# Patient Record
Sex: MALE | Race: BLACK OR AFRICAN AMERICAN | NOT HISPANIC OR LATINO | URBAN - METROPOLITAN AREA
[De-identification: names, ages, dates, MRNs, and addresses within clinical notes are randomized per-mention and may not be internally consistent; named-entity substitution may affect disease eponyms.]

---

## 2017-12-18 ENCOUNTER — HISTORICAL (OUTPATIENT)
Dept: ADMINISTRATIVE | Facility: HOSPITAL | Age: 42
End: 2017-12-18

## 2017-12-18 LAB
ABS NEUT (OLG): 2.51 X10(3)/MCL (ref 2.1–9.2)
ALBUMIN SERPL-MCNC: 4 GM/DL (ref 3.4–5)
ALBUMIN/GLOB SERPL: 1 RATIO (ref 1–2)
ALP SERPL-CCNC: 78 UNIT/L (ref 45–117)
ALT SERPL-CCNC: 20 UNIT/L (ref 12–78)
AST SERPL-CCNC: 15 UNIT/L (ref 15–37)
BASOPHILS # BLD AUTO: 0.04 X10(3)/MCL
BASOPHILS NFR BLD AUTO: 1 % (ref 0–1)
BILIRUB SERPL-MCNC: 0.5 MG/DL (ref 0.2–1)
BILIRUBIN DIRECT+TOT PNL SERPL-MCNC: 0.1 MG/DL
BILIRUBIN DIRECT+TOT PNL SERPL-MCNC: 0.4 MG/DL
BUN SERPL-MCNC: 10 MG/DL (ref 7–18)
CALCIUM SERPL-MCNC: 9 MG/DL (ref 8.5–10.1)
CHLORIDE SERPL-SCNC: 105 MMOL/L (ref 98–107)
CHOLEST SERPL-MCNC: 195 MG/DL
CHOLEST/HDLC SERPL: 3.7 {RATIO} (ref 0–5)
CO2 SERPL-SCNC: 29 MMOL/L (ref 21–32)
CREAT SERPL-MCNC: 1.1 MG/DL (ref 0.6–1.3)
EOSINOPHIL # BLD AUTO: 0.45 10*3/UL
EOSINOPHIL NFR BLD AUTO: 9 % (ref 0–5)
ERYTHROCYTE [DISTWIDTH] IN BLOOD BY AUTOMATED COUNT: 12.3 % (ref 11.5–14.5)
EST. AVERAGE GLUCOSE BLD GHB EST-MCNC: 108 MG/DL
GLOBULIN SER-MCNC: 4.4 GM/ML (ref 2.3–3.5)
GLUCOSE SERPL-MCNC: 89 MG/DL (ref 74–106)
HBA1C MFR BLD: 5.4 % (ref 4.2–6.3)
HCT VFR BLD AUTO: 43.6 % (ref 40–51)
HDLC SERPL-MCNC: 53 MG/DL
HGB BLD-MCNC: 14.6 GM/DL (ref 13.5–17.5)
HIV 1+2 AB+HIV1 P24 AG SERPL QL IA: NONREACTIVE
IMM GRANULOCYTES # BLD AUTO: 0.01 10*3/UL
IMM GRANULOCYTES NFR BLD AUTO: 0 %
LDLC SERPL CALC-MCNC: 131 MG/DL (ref 0–130)
LYMPHOCYTES # BLD AUTO: 1.46 X10(3)/MCL
LYMPHOCYTES NFR BLD AUTO: 30 % (ref 15–40)
MCH RBC QN AUTO: 27.5 PG (ref 26–34)
MCHC RBC AUTO-ENTMCNC: 33.5 GM/DL (ref 31–37)
MCV RBC AUTO: 82.1 FL (ref 80–100)
MONOCYTES # BLD AUTO: 0.37 X10(3)/MCL
MONOCYTES NFR BLD AUTO: 8 % (ref 4–12)
NEUTROPHILS # BLD AUTO: 2.51 X10(3)/MCL
NEUTROPHILS NFR BLD AUTO: 52 X10(3)/MCL
PLATELET # BLD AUTO: 225 X10(3)/MCL (ref 130–400)
PMV BLD AUTO: 12.1 FL (ref 7.4–10.4)
POTASSIUM SERPL-SCNC: 4.2 MMOL/L (ref 3.5–5.1)
PROT SERPL-MCNC: 8.4 GM/DL (ref 6.4–8.2)
RBC # BLD AUTO: 5.31 X10(6)/MCL (ref 4.5–5.9)
SODIUM SERPL-SCNC: 141 MMOL/L (ref 136–145)
TRIGL SERPL-MCNC: 54 MG/DL
VLDLC SERPL CALC-MCNC: 11 MG/DL
WBC # SPEC AUTO: 4.8 X10(3)/MCL (ref 4.5–11)

## 2018-03-14 ENCOUNTER — HISTORICAL (OUTPATIENT)
Dept: FAMILY MEDICINE | Facility: CLINIC | Age: 43
End: 2018-03-14

## 2018-04-20 ENCOUNTER — HISTORICAL (OUTPATIENT)
Dept: RADIOLOGY | Facility: HOSPITAL | Age: 43
End: 2018-04-20

## 2018-10-19 ENCOUNTER — HISTORICAL (OUTPATIENT)
Dept: FAMILY MEDICINE | Facility: CLINIC | Age: 43
End: 2018-10-19

## 2018-10-19 LAB
B-HCG FREE SERPL-ACNC: <1 MIU/ML
LH SERPL-ACNC: 5 MIU/ML (ref 1.2–10.6)
PROLACTIN LEVEL (OHS): 12.3 NG/ML (ref 2.5–17.4)
TESTOST SERPL-MCNC: 511 NG/DL (ref 241–827)

## 2019-09-23 ENCOUNTER — HISTORICAL (OUTPATIENT)
Dept: ADMINISTRATIVE | Facility: HOSPITAL | Age: 44
End: 2019-09-23

## 2019-09-23 LAB
ABS NEUT (OLG): 1.71 X10(3)/MCL (ref 2.1–9.2)
ALBUMIN SERPL-MCNC: 3.8 GM/DL (ref 3.4–5)
ALBUMIN/GLOB SERPL: 0.7 RATIO (ref 1.1–2)
ALP SERPL-CCNC: 85 UNIT/L (ref 45–117)
ALT SERPL-CCNC: 17 UNIT/L (ref 12–78)
AST SERPL-CCNC: 16 UNIT/L (ref 15–37)
BASOPHILS # BLD AUTO: 0 X10(3)/MCL (ref 0–0.2)
BASOPHILS NFR BLD AUTO: 1 %
BILIRUB SERPL-MCNC: 0.5 MG/DL (ref 0.2–1)
BILIRUBIN DIRECT+TOT PNL SERPL-MCNC: 0.2 MG/DL (ref 0–0.2)
BILIRUBIN DIRECT+TOT PNL SERPL-MCNC: 0.3 MG/DL
BUN SERPL-MCNC: 7 MG/DL (ref 7–18)
CALCIUM SERPL-MCNC: 9.2 MG/DL (ref 8.5–10.1)
CHLORIDE SERPL-SCNC: 106 MMOL/L (ref 98–107)
CHOLEST SERPL-MCNC: 221 MG/DL
CHOLEST/HDLC SERPL: 5.5 {RATIO} (ref 0–5)
CO2 SERPL-SCNC: 30 MMOL/L (ref 21–32)
CREAT SERPL-MCNC: 1.1 MG/DL (ref 0.6–1.3)
EOSINOPHIL # BLD AUTO: 0.6 X10(3)/MCL (ref 0–0.9)
EOSINOPHIL NFR BLD AUTO: 13 %
ERYTHROCYTE [DISTWIDTH] IN BLOOD BY AUTOMATED COUNT: 12.3 % (ref 11.5–14.5)
EST. AVERAGE GLUCOSE BLD GHB EST-MCNC: 105 MG/DL
GLOBULIN SER-MCNC: 5.1 GM/ML (ref 2.3–3.5)
GLUCOSE SERPL-MCNC: 65 MG/DL (ref 74–106)
HBA1C MFR BLD: 5.3 % (ref 4.2–6.3)
HBV SURFACE AB SER-ACNC: <3.1 MIU/ML
HBV SURFACE AB SERPL IA-ACNC: NONREACTIVE M[IU]/ML
HCT VFR BLD AUTO: 45.7 % (ref 40–51)
HCV AB SERPL QL IA: NONREACTIVE
HDLC SERPL-MCNC: 40 MG/DL (ref 40–59)
HGB BLD-MCNC: 14.8 GM/DL (ref 13.5–17.5)
HIV 1+2 AB+HIV1 P24 AG SERPL QL IA: NONREACTIVE
IMM GRANULOCYTES # BLD AUTO: 0.01 10*3/UL
IMM GRANULOCYTES NFR BLD AUTO: 0 %
LDLC SERPL CALC-MCNC: 168 MG/DL
LYMPHOCYTES # BLD AUTO: 1.8 X10(3)/MCL (ref 0.6–4.6)
LYMPHOCYTES NFR BLD AUTO: 38 %
MCH RBC QN AUTO: 27 PG (ref 26–34)
MCHC RBC AUTO-ENTMCNC: 32.4 GM/DL (ref 31–37)
MCV RBC AUTO: 83.2 FL (ref 80–100)
MONOCYTES # BLD AUTO: 0.6 X10(3)/MCL (ref 0.1–1.3)
MONOCYTES NFR BLD AUTO: 12 %
NEUTROPHILS # BLD AUTO: 1.71 X10(3)/MCL (ref 2.1–9.2)
NEUTROPHILS NFR BLD AUTO: 36 %
PLATELET # BLD AUTO: 213 X10(3)/MCL (ref 130–400)
PMV BLD AUTO: 11.8 FL (ref 7.4–10.4)
POTASSIUM SERPL-SCNC: 4.2 MMOL/L (ref 3.5–5.1)
PROT SERPL-MCNC: 8.9 GM/DL (ref 6.4–8.2)
RBC # BLD AUTO: 5.49 X10(6)/MCL (ref 4.5–5.9)
RPR SER QL: REACTIVE
SODIUM SERPL-SCNC: 139 MMOL/L (ref 136–145)
T PALLIDUM AB SER QL: REACTIVE
TRIGL SERPL-MCNC: 63 MG/DL
VLDLC SERPL CALC-MCNC: 13 MG/DL
WBC # SPEC AUTO: 4.8 X10(3)/MCL (ref 4.5–11)

## 2021-02-04 ENCOUNTER — HISTORICAL (OUTPATIENT)
Dept: ADMINISTRATIVE | Facility: HOSPITAL | Age: 46
End: 2021-02-04

## 2021-02-04 LAB
ABS NEUT (OLG): 2.18 X10(3)/MCL (ref 2.1–9.2)
ALBUMIN SERPL-MCNC: 3.9 GM/DL (ref 3.5–5)
ALBUMIN/GLOB SERPL: 0.9 RATIO (ref 1.1–2)
ALP SERPL-CCNC: 72 UNIT/L (ref 40–150)
ALT SERPL-CCNC: 21 UNIT/L (ref 0–55)
AST SERPL-CCNC: 20 UNIT/L (ref 5–34)
BASOPHILS # BLD AUTO: 0 X10(3)/MCL (ref 0–0.2)
BASOPHILS NFR BLD AUTO: 1 %
BILIRUB SERPL-MCNC: 0.6 MG/DL
BILIRUBIN DIRECT+TOT PNL SERPL-MCNC: 0.2 MG/DL (ref 0–0.5)
BILIRUBIN DIRECT+TOT PNL SERPL-MCNC: 0.4 MG/DL (ref 0–0.8)
BUN SERPL-MCNC: 9 MG/DL (ref 8.9–20.6)
CALCIUM SERPL-MCNC: 9.3 MG/DL (ref 8.4–10.2)
CHLORIDE SERPL-SCNC: 105 MMOL/L (ref 98–107)
CHOLEST SERPL-MCNC: 245 MG/DL
CHOLEST/HDLC SERPL: 6 {RATIO} (ref 0–5)
CO2 SERPL-SCNC: 27 MMOL/L (ref 22–29)
CREAT SERPL-MCNC: 1.12 MG/DL (ref 0.73–1.18)
EOSINOPHIL # BLD AUTO: 0.4 X10(3)/MCL (ref 0–0.9)
EOSINOPHIL NFR BLD AUTO: 9 %
ERYTHROCYTE [DISTWIDTH] IN BLOOD BY AUTOMATED COUNT: 12.4 % (ref 11.5–14.5)
EST. AVERAGE GLUCOSE BLD GHB EST-MCNC: 114 MG/DL
GLOBULIN SER-MCNC: 4.2 GM/DL (ref 2.4–3.5)
GLUCOSE SERPL-MCNC: 84 MG/DL (ref 74–100)
HBA1C MFR BLD: 5.6 %
HCT VFR BLD AUTO: 43.7 % (ref 40–51)
HDLC SERPL-MCNC: 38 MG/DL (ref 35–60)
HGB BLD-MCNC: 14.3 GM/DL (ref 13.5–17.5)
IMM GRANULOCYTES # BLD AUTO: 0.02 10*3/UL
IMM GRANULOCYTES NFR BLD AUTO: 0 %
LDLC SERPL CALC-MCNC: 195 MG/DL (ref 50–140)
LYMPHOCYTES # BLD AUTO: 1.7 X10(3)/MCL (ref 0.6–4.6)
LYMPHOCYTES NFR BLD AUTO: 35 %
MCH RBC QN AUTO: 27.2 PG (ref 26–34)
MCHC RBC AUTO-ENTMCNC: 32.7 GM/DL (ref 31–37)
MCV RBC AUTO: 83.1 FL (ref 80–100)
MONOCYTES # BLD AUTO: 0.4 X10(3)/MCL (ref 0.1–1.3)
MONOCYTES NFR BLD AUTO: 9 %
NEUTROPHILS # BLD AUTO: 2.18 X10(3)/MCL (ref 2.1–9.2)
NEUTROPHILS NFR BLD AUTO: 45 %
PLATELET # BLD AUTO: 213 X10(3)/MCL (ref 130–400)
PMV BLD AUTO: 11.7 FL (ref 7.4–10.4)
POTASSIUM SERPL-SCNC: 4.2 MMOL/L (ref 3.5–5.1)
PROT SERPL-MCNC: 8.1 GM/DL (ref 6.4–8.3)
RBC # BLD AUTO: 5.26 X10(6)/MCL (ref 4.5–5.9)
SODIUM SERPL-SCNC: 139 MMOL/L (ref 136–145)
TRIGL SERPL-MCNC: 60 MG/DL (ref 34–140)
VLDLC SERPL CALC-MCNC: 12 MG/DL
WBC # SPEC AUTO: 4.8 X10(3)/MCL (ref 4.5–11)

## 2021-05-26 ENCOUNTER — HISTORICAL (OUTPATIENT)
Dept: RESPIRATORY THERAPY | Facility: HOSPITAL | Age: 46
End: 2021-05-26

## 2021-11-05 ENCOUNTER — HISTORICAL (OUTPATIENT)
Dept: ADMINISTRATIVE | Facility: HOSPITAL | Age: 46
End: 2021-11-05

## 2021-11-05 LAB
CHOLEST SERPL-MCNC: 247 MG/DL
CHOLEST/HDLC SERPL: 5 {RATIO} (ref 0–5)
HDLC SERPL-MCNC: 45 MG/DL (ref 35–60)
LDLC SERPL CALC-MCNC: 188 MG/DL (ref 50–140)
TRIGL SERPL-MCNC: 70 MG/DL (ref 34–140)
VLDLC SERPL CALC-MCNC: 14 MG/DL

## 2022-04-10 ENCOUNTER — HISTORICAL (OUTPATIENT)
Dept: ADMINISTRATIVE | Facility: HOSPITAL | Age: 47
End: 2022-04-10
Payer: MEDICAID

## 2022-04-28 VITALS
OXYGEN SATURATION: 98 % | DIASTOLIC BLOOD PRESSURE: 76 MMHG | SYSTOLIC BLOOD PRESSURE: 121 MMHG | HEIGHT: 71 IN | BODY MASS INDEX: 22.41 KG/M2 | WEIGHT: 160.06 LBS

## 2022-05-04 NOTE — HISTORICAL [facility] CERNER
This is a historical note converted from Dian. Formatting and pictures may have been removed.  Please reference Dian for original formatting and attached multimedia. Chief Complaint  routine f/u  History of Present Illness  Mr. Adkins is a 44 y/o male with PMHx of depression, mild intermittent asthma, and HLD?presenting for routine follow-up. He reports no current complaints  ?  HLD  Pt reports he takes medication 3-4 times per week. he has not been able to make diet changes.  ?  Depression  ?He reports continued good mood. denies SI/HI, fatigue, guilt, decreased interest. He feels current medication helps him though he does not appear to take medication every day as prescribed.  ?  Review of Systems  Constitutional:?no fevers, chills or fatigue  Respiratory:?no trouble breathing or shortness of breath  Cardiovascular:?no chest pain or tightness,?no shortness breath on activity,?no ankle swelling  Gastrointestinal:no constipation,?no diarrhea,?no nausea,?no vomiting  Musculoskeletal:?No muscle pain,?no joint pain  Integumentary: no rashes or breakdown  Neurologic: no dizziness, no fainting  Physical Exam  Vitals & Measurements  T:?37.1? ?C (Oral)? HR:?75(Peripheral)? RR:?18? BP:?121/76? SpO2:?98%?  HT:?180.00?cm? WT:?72.600?kg? BMI:?22.41?  General:?Alert and oriented,?no acute distress  Respiratory:?Lungs clear to auscultation bilaterally,?No increased work of breathing  Cardiovascular:?S1-S2, regular rate, regular rhythm,?2+ radial pulses,?No lower extremity edema  Gastrointestinal:?Soft, nontender, nondistended  Psych: Calm, cooperative  Assessment/Plan  1.?Depressive disorder(  Confirmed  )?F32.9  - Continue Fluoxetine  - Stable. Continue to monitor  Ordered:  Clinic Follow up, *Est. 02/05/22 3:00:00 CST, Order for future visit, Depressive disorder(  Confirmed  )  HLD (hyperlipidemia)(  Confirmed  )  Immunization due, [facility]  ?  2.?HLD (hyperlipidemia)(  Confirmed  )?E78.5  - Lipid  panel today  - Hand out provided  - Refill Atorvastatin  Ordered:  Clinic Follow up, *Est. 02/05/22 3:00:00 CST, Order for future visit, Depressive disorder(  Confirmed  )  HLD (hyperlipidemia)(  Confirmed  )  Immunization due, [facility]  Lipid Panel, Routine collect, *Est. 11/05/21 3:00:00 CDT, Blood, Order for future visit, *Est. Stop date 11/05/21 3:00:00 CDT, Lab Collect, HLD (hyperlipidemia)(  Confirmed  ), 11/05/21 13:43:00 CDT  ?  3.?Immunization due?Z23  ?- Declines Flu vaccination at this time  Ordered:  Clinic Follow up, *Est. 02/05/22 3:00:00 CST, Order for future visit, Depressive disorder(  Confirmed  )  HLD (hyperlipidemia)(  Confirmed  )  Immunization due, [facility]  ?  Orders:  atorvastatin, See Instructions, TAKE 1 TABLET BY MOUTH EVERY DAY, # 30 tab(s), 2 Refill(s), Pharmacy: [facility]/pharmacy #[id number], 180, cm, Height/Length Dosing, 11/05/21 13:09:00 CDT, 72.6, kg, Weight Dosing, 11/05/21 13:09:00 CDT  FLUoxetine, See Instructions, TAKE 1 CAPSULE BY MOUTH EVERY DAY, # 90 cap(s), 0 Refill(s), Pharmacy: [facility]/pharmacy #[id number], 180, cm, Height/Length Dosing, 11/05/21 13:09:00 CDT, 72.6, kg, Weight Dosing, 11/05/21 13:09:00 CDT  ?  RTC in 3 months f/u asthma and health maintenance  ?  July Rosado M.D.  [facility]  ?  Referrals  Clinic Follow up, *Est. 02/05/22 3:00:00 CST, Order for future visit, Depressive disorder(  Confirmed  )  HLD (hyperlipidemia)(  Confirmed  )  Immunization due, [facility]   Problem List/Past Medical History  Ongoing  Asthma(  Confirmed  )  Depressive disorder(  Confirmed  )  Gynecomastia  HLD (hyperlipidemia)(  Confirmed  )  Historical  No qualifying data  Procedure/Surgical History  skin lesion removed on face   Medications  Albuterol (Eqv-ProAir HFA) 90 mcg/inh inhalation aerosol, See Instructions  atorvastatin 40 mg oral tablet, See Instructions, 2 refills  fluoxetine 20 mg oral capsule, See  Instructions  Allergies  No Known Medication Allergies  Social History  Abuse/Neglect  No, 04/27/2021  Alcohol  Current, Beer, 1-2 times per month, Alcohol use interferes with work or home: No. Drinks more than intended: No. Others hurt by drinking: No. Ready to change: No. Household alcohol concerns: No., 10/17/2018  Employment/School  Work/School description: Disabled. Highest education level: High school. Operates hazardous equipment: No., 10/17/2018  Exercise  Exercise frequency: 1-2 times/week. Self assessment: Fair condition. Exercise type: Walking., 03/10/2015  Home/Environment  Lives with Siblings. Alcohol abuse in household: No. Substance abuse in household: No. Smoker in household: Yes. Injuries/Abuse/Neglect in household: No. Feels unsafe at home: No. Safe place to go: Yes. Family/Friends available for support: Yes., 03/10/2015  Lives with Siblings. Alcohol abuse in household: No. Substance abuse in household: No. Smoker in household: Yes. Injuries/Abuse/Neglect in household: No. Feels unsafe at home: No. Safe place to go: Yes. Family/Friends available for support: Yes., 03/10/2015  Nutrition/Health  Regular, 03/10/2015  Sexual  Sexually active: Yes. Sexually active at age 19 Years. Number of current partners 1. Number of lifetime partners 10. Sexual orientation: Straight or heterosexual. Uses condoms: Yes. History of sexual abuse: No. Gender Identity Identifies as male., 01/11/2019  Spiritual/Cultural  None, 09/23/2019  Substance Use - Denies Substance Abuse, 02/28/2015  Never, 10/17/2018  Tobacco  Never (less than 100 in lifetime), N/A, 04/27/2021  Family History  Heart disease: Mother.  Mental illness: Mother.  Schizophrenia: Brother.  Immunizations  Vaccine Date Status   influenza virus vaccine, inactivated 12/03/2018 Recorded   tetanus/diphtheria/pertussis, acel(Tdap) 10/17/2018 Given   influenza virus vaccine, inactivated 01/09/2015 Recorded   influenza virus vaccine, inactivated 11/01/2011 Recorded    Lab Results  Test Name Test Result Date/Time   Hgb A1c 5.6 % 02/04/2021 10:35 CST   Chol 245 mg/dL (High) 02/04/2021 10:35 CST   Trig 60 mg/dL 02/04/2021 10:35 CST   .00 mg/dL (High) 02/04/2021 10:35 CST       [ x ] I discussed the case with the resident and agree with the findings and plan as documented in the residents note.

## 2022-05-04 NOTE — HISTORICAL [facility] CERNER
This is a historical note converted from Dian. Formatting and pictures may have been removed.  Please reference Dian for original formatting and attached multimedia. Chief Complaint  Routine follow up. ?No complaints  History of Present Illness  Mr. Multani is a 46 yo M with PMHx of depression and mild intermittent asthma who presents?for routine follow-up without complaints.  ?   Asthma: well controlled with as needed albuterol only.? Uses once per week.? No cough, wheezing, fever, or chest tightness.  Depression: On fluoxetine 20mg daily.? No issues.? Sleeping and eating well.? Denies depressed mood or feelings of guilt or worthlessness.? No SI/HI or hallucinations.  Review of Systems  Constitutional: no fever, no chills  CV: no chest pain  Resp: no shortness of breath  GI: no nausea, no vomiting, no constipation, no diarrhea  MSK: no weakness  Skin: no rashes, no discoloration, no wounds  Physical Exam  Vitals & Measurements  T:?36.9? ?C (Oral)? HR:?71(Peripheral)? RR:?16? BP:?116/71?  HT:?180.34?cm? WT:?75.300?kg? BMI:?23.15?  General: well developed;?pleasant and cooperative  HEENT: oral mucosa moist, EOMI  Skin: no rashes, no discoloration  CV: regular rate, well perfused  Resp: non-labored breathing, no audible?wheezes  MSK:?moves all limbs,?no swelling  Neuro: AAOx4, appropriate mood and affect  Assessment/Plan  1.?Asthma(  Confirmed  )?J45.909  - continue albuterol as needed  - states he got flu vaccine at [facility] last month.? no record of this.? Asked him to get copy and bring to clinic if able to.  Ordered:  Clinic Follow up, *Est. 05/04/21 3:00:00 CDT, Order for future visit, Asthma(  Confirmed  )  Depressive disorder(  Confirmed  )  HLD (hyperlipidemia)(  Confirmed  ), [facility]  ?  2.?Depressive disorder(  Confirmed  )?F32.9  - seems well controlled at this time; continue fluoxetine 20mg daily  - ED precautions for SI/HI or hallucinations  Ordered:  Clinic Follow up, *Est.  05/04/21 3:00:00 CDT, Order for future visit, Asthma(  Confirmed  )  Depressive disorder(  Confirmed  )  HLD (hyperlipidemia)(  Confirmed  ), [facility]  ?  3.?HLD (hyperlipidemia)(  Confirmed  )?E78.5  - no meds currently; recheck FLP  - will also check CBC, CMP, and HgbA1c  Ordered:  CBC w/ Auto Diff, Routine collect, *Est. 02/04/21 3:00:00 CST, Blood, Order for future visit, *Est. Stop date 02/04/21 3:00:00 CST, Lab Collect, HLD (hyperlipidemia)(  Confirmed  ), 02/04/21 10:19:00 CST  Clinic Follow up, *Est. 05/04/21 3:00:00 CDT, Order for future visit, Asthma(  Confirmed  )  Depressive disorder(  Confirmed  )  HLD (hyperlipidemia)(  Confirmed  ), [facility]  Comprehensive Metabolic Panel, Routine collect, *Est. 02/04/21 3:00:00 CST, Blood, Order for future visit, *Est. Stop date 02/04/21 3:00:00 CST, Lab Collect, HLD (hyperlipidemia)(  Confirmed  ), 02/04/21 10:19:00 CST  Hemoglobin A1C [facility], Routine collect, *Est. 02/04/21 3:00:00 CST, Blood, Order for future visit, *Est. Stop date 02/04/21 3:00:00 CST, Lab Collect, HLD (hyperlipidemia)(  Confirmed  ), 02/04/21 10:19:00 CST  Lipid Panel, Routine collect, *Est. 02/04/21 3:00:00 CST, Blood, Order for future visit, *Est. Stop date 02/04/21 3:00:00 CST, Lab Collect, HLD (hyperlipidemia)(  Confirmed  ), 02/04/21 10:19:00 CST  ?  Referrals  Clinic Follow up, *Est. 05/04/21 3:00:00 CDT, Order for future visit, Asthma(  Confirmed  )  Depressive disorder(  Confirmed  )  HLD (hyperlipidemia)(  Confirmed  ), [facility]  ?  RTC in 3 months with PCP Dr. Wynn  ?  Williams Lerner MD  [facility], PGY-3   Problem List/Past Medical History  Ongoing  Asthma(  Confirmed  )  Depressive disorder(  Confirmed  )  Gynecomastia  HLD (hyperlipidemia)(  Confirmed  )  Historical  No qualifying data  Procedure/Surgical History  skin lesion removed on face   Medications  fluoxetine 20 mg oral  capsule, 20 mg= 1 cap(s), Oral, Daily, 3 refills  ProAir HFA 90 mcg/inh inhalation aerosol with adapter, 2 puff(s), INH, q6hr, 1 refills  Allergies  No Known Medication Allergies  Social History  Abuse/Neglect  No, 09/23/2019  Alcohol  Current, Beer, 1-2 times per month, Alcohol use interferes with work or home: No. Drinks more than intended: No. Others hurt by drinking: No. Ready to change: No. Household alcohol concerns: No., 10/17/2018  Employment/School  Work/School description: Disabled. Highest education level: High school. Operates hazardous equipment: No., 10/17/2018  Exercise  Exercise frequency: 1-2 times/week. Self assessment: Fair condition. Exercise type: Walking., 03/10/2015  Home/Environment  Lives with Siblings. Alcohol abuse in household: No. Substance abuse in household: No. Smoker in household: Yes. Injuries/Abuse/Neglect in household: No. Feels unsafe at home: No. Safe place to go: Yes. Family/Friends available for support: Yes., 03/10/2015  Lives with Siblings. Alcohol abuse in household: No. Substance abuse in household: No. Smoker in household: Yes. Injuries/Abuse/Neglect in household: No. Feels unsafe at home: No. Safe place to go: Yes. Family/Friends available for support: Yes., 03/10/2015  Nutrition/Health  Regular, 03/10/2015  Sexual  Sexually active: Yes. Sexually active at age 19 Years. Number of current partners 1. Number of lifetime partners 10. Sexual orientation: Straight or heterosexual. Uses condoms: Yes. History of sexual abuse: No. Gender Identity Identifies as male., 01/11/2019  Spiritual/Cultural  None, 09/23/2019  Substance Use - Denies Substance Abuse, 02/28/2015  Never, 10/17/2018  Tobacco  Never (less than 100 in lifetime), N/A, 02/04/2021  Family History  Heart disease: Mother.  Mental illness: Mother.  Schizophrenia: Brother.  Immunizations  Vaccine Date Status   influenza virus vaccine, inactivated 12/03/2018 Recorded   tetanus/diphtheria/pertussis, acel(Tdap) 10/17/2018  Given   influenza virus vaccine, inactivated 01/09/2015 Recorded   influenza virus vaccine, inactivated 11/01/2011 Recorded       [ x ] I reviewed the case and agree with the findings and plan as documented in the residents note.

## 2022-09-23 RX ORDER — FLUOXETINE HYDROCHLORIDE 20 MG/1
20 CAPSULE ORAL DAILY
Qty: 90 CAPSULE | Refills: 0 | Status: CANCELLED | OUTPATIENT
Start: 2022-09-23

## 2022-09-23 RX ORDER — FLUOXETINE HYDROCHLORIDE 20 MG/1
1 CAPSULE ORAL DAILY
COMMUNITY
Start: 2022-01-31 | End: 2022-09-26 | Stop reason: SDUPTHER

## 2022-09-23 RX ORDER — ATORVASTATIN CALCIUM 40 MG/1
1 TABLET, FILM COATED ORAL DAILY
COMMUNITY
Start: 2022-01-31 | End: 2022-09-26 | Stop reason: SDUPTHER

## 2022-09-26 RX ORDER — FLUOXETINE HYDROCHLORIDE 20 MG/1
20 CAPSULE ORAL DAILY
Qty: 30 CAPSULE | Refills: 2 | Status: SHIPPED | OUTPATIENT
Start: 2022-09-26 | End: 2022-10-04 | Stop reason: SDUPTHER

## 2022-09-26 RX ORDER — ATORVASTATIN CALCIUM 40 MG/1
40 TABLET, FILM COATED ORAL DAILY
Qty: 30 TABLET | Refills: 1 | Status: SHIPPED | OUTPATIENT
Start: 2022-09-26 | End: 2022-10-04 | Stop reason: SDUPTHER

## 2022-10-04 ENCOUNTER — OFFICE VISIT (OUTPATIENT)
Dept: FAMILY MEDICINE | Facility: CLINIC | Age: 47
End: 2022-10-04
Payer: MEDICAID

## 2022-10-04 VITALS
RESPIRATION RATE: 18 BRPM | WEIGHT: 159.38 LBS | HEART RATE: 80 BPM | OXYGEN SATURATION: 98 % | SYSTOLIC BLOOD PRESSURE: 121 MMHG | HEIGHT: 70 IN | DIASTOLIC BLOOD PRESSURE: 82 MMHG | TEMPERATURE: 98 F | BODY MASS INDEX: 22.82 KG/M2

## 2022-10-04 DIAGNOSIS — Z28.21 INFLUENZA VACCINATION DECLINED: ICD-10-CM

## 2022-10-04 DIAGNOSIS — Z76.0 ENCOUNTER FOR MEDICATION REFILL: ICD-10-CM

## 2022-10-04 DIAGNOSIS — E78.5 HYPERLIPIDEMIA, UNSPECIFIED HYPERLIPIDEMIA TYPE: ICD-10-CM

## 2022-10-04 DIAGNOSIS — F32.A DEPRESSIVE DISORDER: Primary | ICD-10-CM

## 2022-10-04 PROBLEM — J45.909 ASTHMA: Status: ACTIVE | Noted: 2022-10-04

## 2022-10-04 PROCEDURE — 99213 OFFICE O/P EST LOW 20 MIN: CPT | Mod: PBBFAC

## 2022-10-04 RX ORDER — ATORVASTATIN CALCIUM 40 MG/1
40 TABLET, FILM COATED ORAL DAILY
Qty: 90 TABLET | Refills: 0 | Status: SHIPPED | OUTPATIENT
Start: 2022-10-04 | End: 2023-02-10 | Stop reason: SDUPTHER

## 2022-10-04 RX ORDER — FLUOXETINE HYDROCHLORIDE 20 MG/1
20 CAPSULE ORAL DAILY
Qty: 90 CAPSULE | Refills: 0 | Status: SHIPPED | OUTPATIENT
Start: 2022-10-04 | End: 2023-02-10 | Stop reason: SDUPTHER

## 2022-10-04 NOTE — PROGRESS NOTES
Family Medicine Clinic    Subjective:       Patient ID: Jed Multani is a 46 y.o. male.    Chief Complaint: Routine F/u    Acute Issues: No complaints today. Need medication refill.    Chronic Issues:  Depression - Compliant Fluoxetine 20 mg. denies depressed mood, guilt, decreased energy, sadness, and SI/HI. Feels ood is good overall    Asthma- Albuterol PRN, PFT's 5/26/21.  Estimates using inhaler 1x/month.    HLD- compliant with daily atorvastatin 40 mg. Baseline LDL >195 per     Review of Systems   Constitutional:  Negative for activity change, fatigue and fever.   Respiratory:  Negative for cough, shortness of breath and wheezing.    Cardiovascular:  Negative for chest pain.   Gastrointestinal:  Negative for abdominal pain, change in bowel habit and change in bowel habit.   Musculoskeletal:  Negative for myalgias.   Psychiatric/Behavioral:  Negative for behavioral problems, decreased concentration, dysphoric mood, self-injury and suicidal ideas.        Objective:      Vitals:    10/04/22 1535   BP: 121/82   Pulse: 80   Resp: 18   Temp: 97.9 °F (36.6 °C)       Physical Exam  Constitutional:       General: He is not in acute distress.     Appearance: Normal appearance. He is normal weight.   Cardiovascular:      Rate and Rhythm: Normal rate and regular rhythm.      Heart sounds: No murmur heard.  Pulmonary:      Effort: Pulmonary effort is normal. No respiratory distress.      Breath sounds: Normal breath sounds. No wheezing.   Musculoskeletal:      Right lower leg: No edema.      Left lower leg: No edema.   Skin:     General: Skin is warm and dry.   Psychiatric:         Mood and Affect: Mood normal.         Behavior: Behavior normal.         Thought Content: Thought content normal.       Assessment:       Problem List Items Addressed This Visit          Psychiatric    Depressive disorder - Primary       Cardiac/Vascular    Hyperlipidemia     Other Visit Diagnoses       Encounter for medication refill         Influenza vaccination declined                Plan:       Continue Fluoxetine 20 mg  Continue atorvastatin 40 mg  Albuterol PRN    Annual labs next visit    RTC in 3 months

## 2022-10-08 NOTE — PROGRESS NOTES
I reviewed History, PE, A/P and chart was reviewed.  Services provided in a teaching facility, I was immediately available  I agree with resident, care reasonable and necessary.   Management discussed with resident at time of visit.    Needs labs next appt    Discuss CRC/PSA screening  -explore FH    Pretx     Mikala Blount MD  [facility] - [facility] [place]  [facility]

## 2023-02-10 ENCOUNTER — OFFICE VISIT (OUTPATIENT)
Dept: FAMILY MEDICINE | Facility: CLINIC | Age: 48
End: 2023-02-10
Payer: MEDICAID

## 2023-02-10 VITALS
OXYGEN SATURATION: 99 % | RESPIRATION RATE: 18 BRPM | WEIGHT: 162.69 LBS | SYSTOLIC BLOOD PRESSURE: 126 MMHG | HEIGHT: 70 IN | HEART RATE: 78 BPM | TEMPERATURE: 99 F | BODY MASS INDEX: 23.29 KG/M2 | DIASTOLIC BLOOD PRESSURE: 82 MMHG

## 2023-02-10 DIAGNOSIS — E78.5 HYPERLIPIDEMIA, UNSPECIFIED HYPERLIPIDEMIA TYPE: ICD-10-CM

## 2023-02-10 DIAGNOSIS — Z12.11 SCREENING FOR COLON CANCER: ICD-10-CM

## 2023-02-10 DIAGNOSIS — Z23 IMMUNIZATION DUE: ICD-10-CM

## 2023-02-10 DIAGNOSIS — F32.A DEPRESSIVE DISORDER: Primary | ICD-10-CM

## 2023-02-10 LAB
ALBUMIN SERPL-MCNC: 3.8 G/DL (ref 3.5–5)
ALBUMIN/GLOB SERPL: 0.8 RATIO (ref 1.1–2)
ALP SERPL-CCNC: 93 UNIT/L (ref 40–150)
ALT SERPL-CCNC: 33 UNIT/L (ref 0–55)
AST SERPL-CCNC: 23 UNIT/L (ref 5–34)
BASOPHILS # BLD AUTO: 0.03 X10(3)/MCL (ref 0–0.2)
BASOPHILS NFR BLD AUTO: 0.5 %
BILIRUBIN DIRECT+TOT PNL SERPL-MCNC: 0.4 MG/DL
BUN SERPL-MCNC: 9.1 MG/DL (ref 8.9–20.6)
CALCIUM SERPL-MCNC: 9.9 MG/DL (ref 8.4–10.2)
CHLORIDE SERPL-SCNC: 105 MMOL/L (ref 98–107)
CHOLEST SERPL-MCNC: 191 MG/DL
CHOLEST/HDLC SERPL: 5 {RATIO} (ref 0–5)
CO2 SERPL-SCNC: 27 MMOL/L (ref 22–29)
CREAT SERPL-MCNC: 1.03 MG/DL (ref 0.73–1.18)
EOSINOPHIL # BLD AUTO: 0.36 X10(3)/MCL (ref 0–0.9)
EOSINOPHIL NFR BLD AUTO: 6 %
ERYTHROCYTE [DISTWIDTH] IN BLOOD BY AUTOMATED COUNT: 12.4 % (ref 11.5–17)
GFR SERPLBLD CREATININE-BSD FMLA CKD-EPI: >60 MLS/MIN/1.73/M2
GLOBULIN SER-MCNC: 5 GM/DL (ref 2.4–3.5)
GLUCOSE SERPL-MCNC: 93 MG/DL (ref 74–100)
HCT VFR BLD AUTO: 44.8 % (ref 42–52)
HDLC SERPL-MCNC: 40 MG/DL (ref 35–60)
HGB BLD-MCNC: 15.1 GM/DL (ref 14–18)
IMM GRANULOCYTES # BLD AUTO: 0.03 X10(3)/MCL (ref 0–0.04)
IMM GRANULOCYTES NFR BLD AUTO: 0.5 %
LDLC SERPL CALC-MCNC: 136 MG/DL (ref 50–140)
LYMPHOCYTES # BLD AUTO: 2.06 X10(3)/MCL (ref 0.6–4.6)
LYMPHOCYTES NFR BLD AUTO: 34.5 %
MCH RBC QN AUTO: 27.6 PG
MCHC RBC AUTO-ENTMCNC: 33.7 MG/DL (ref 33–36)
MCV RBC AUTO: 81.8 FL (ref 80–94)
MONOCYTES # BLD AUTO: 0.36 X10(3)/MCL (ref 0.1–1.3)
MONOCYTES NFR BLD AUTO: 6 %
NEUTROPHILS # BLD AUTO: 3.13 X10(3)/MCL (ref 2.1–9.2)
NEUTROPHILS NFR BLD AUTO: 52.5 %
NRBC BLD AUTO-RTO: 0 %
PLATELET # BLD AUTO: 329 X10(3)/MCL (ref 130–400)
PMV BLD AUTO: 11.2 FL (ref 7.4–10.4)
POTASSIUM SERPL-SCNC: 4.4 MMOL/L (ref 3.5–5.1)
PROT SERPL-MCNC: 8.8 GM/DL (ref 6.4–8.3)
RBC # BLD AUTO: 5.48 X10(6)/MCL (ref 4.7–6.1)
SODIUM SERPL-SCNC: 139 MMOL/L (ref 136–145)
TRIGL SERPL-MCNC: 73 MG/DL (ref 34–140)
VLDLC SERPL CALC-MCNC: 15 MG/DL
WBC # SPEC AUTO: 6 X10(3)/MCL (ref 4.5–11.5)

## 2023-02-10 PROCEDURE — 80061 LIPID PANEL: CPT

## 2023-02-10 PROCEDURE — 36415 COLL VENOUS BLD VENIPUNCTURE: CPT

## 2023-02-10 PROCEDURE — 80053 COMPREHEN METABOLIC PANEL: CPT

## 2023-02-10 PROCEDURE — 85025 COMPLETE CBC W/AUTO DIFF WBC: CPT

## 2023-02-10 PROCEDURE — 99213 OFFICE O/P EST LOW 20 MIN: CPT | Mod: PBBFAC

## 2023-02-10 RX ORDER — ATORVASTATIN CALCIUM 40 MG/1
40 TABLET, FILM COATED ORAL DAILY
Qty: 90 TABLET | Refills: 0 | Status: SHIPPED | OUTPATIENT
Start: 2023-02-10 | End: 2023-06-08 | Stop reason: SDUPTHER

## 2023-02-10 RX ORDER — FLUOXETINE HYDROCHLORIDE 20 MG/1
20 CAPSULE ORAL DAILY
Qty: 90 CAPSULE | Refills: 0 | Status: SHIPPED | OUTPATIENT
Start: 2023-02-10 | End: 2023-07-24 | Stop reason: SDUPTHER

## 2023-02-10 NOTE — PROGRESS NOTES
Chief Complaint  Depression (Patient states his lower abdominal has been hurting for about two weeks)      History of Present Illness  Jed Multani is a 47 y.o. year old male presents to the clinic for routine f/u; last seen 10/04/2022    Acute issues:   2 weeks ago bl flank pain, now resolved with OTC ibuprofen  No fever, n/v, dysuria, hematuria, urinary urgency/frequency  No previous episode or reoccurrence      Chronic Issues:  Depression - Adherent to Fluoxetine 20 mg. denies depressed mood, guilt, decreased energy, sadness, and SI/HI. Feels mood is great     Asthma- Albuterol PRN, PFT's 5/26/21.  Estimates using inhaler 1x/month.    HLD- compliant with daily atorvastatin 40 mg. Baseline LDL >195 per, A1c 5.6 2/2021. No myalgias, weakness    Health Maintenance  Colon Cancer: due  Lung Cancer: N/A  AAA: N/A  Vaccinations: Influenza due      ROS per HPI      Physical Exam    Vitals:    02/10/23 0842   BP: 126/82   Pulse: 78   Resp: 18   Temp: 98.6 °F (37 °C)     Wt Readings from Last 2 Encounters:   02/10/23 73.8 kg (162 lb 11.2 oz)   10/04/22 72.3 kg (159 lb 6.3 oz)     Gen: NAD  Pulm: Nonlabored, CTABL  CV: RRR, no MRG, no peripheral edema  Abd: soft, NT, ND, Normoactive BS, no CVAT  MSK: no gait abnormalities      Current Outpatient Medications  Current Outpatient Medications   Medication Instructions    albuterol sulfate 90 mcg/actuation aebs 2 puffs, Inhalation, Every 6 hours PRN    atorvastatin (LIPITOR) 40 mg, Oral, Daily    FLUoxetine 20 mg, Oral, Daily             Assessment / Plan:    Depressive disorder  Stable  PHQ-9: 0 today 2/10/2023  Refilled and continued current management   -     FLUoxetine 20 MG capsule; Take 1 capsule (20 mg total) by mouth once daily.  Dispense: 90 capsule; Refill: 0    Hyperlipidemia, unspecified hyperlipidemia type  -     CBC Auto Differential  -     Comprehensive Metabolic Panel  -     Lipid Panel  -     atorvastatin (LIPITOR) 40 MG tablet; Take 1 tablet (40 mg total)  by mouth once daily.  Dispense: 90 tablet; Refill: 0    Bilateral flank pain  Resolved  Patient informed to inform clinic if any reoccurrence  Encourages staying well hydrated    Screening for colon cancer  -     Cologuard Screening (Multitarget Stool DNA); Future; Expected date: 02/10/2023    Immunization due  Informed refusal of Influenza          Follow up:    In 6 months, or earlier if needed.     Kena Gomez MD  [employer] [facility] PGY-2

## 2023-02-11 NOTE — PROGRESS NOTES
I reviewed History, PE, A/P and chart was reviewed.  Services provided in the outpatient department of  a teaching facility, I was immediately available.  I agree with resident, care reasonable and necessary.   Management discussed with resident at time of visit.    Please offer PSA next visit    Pretx     Mikala Blount MD  [facility] - [place] [place]  [facility]

## 2023-02-22 LAB — NONINV COLON CA DNA+OCC BLD SCRN STL QL: NEGATIVE

## 2023-06-08 DIAGNOSIS — E78.5 HYPERLIPIDEMIA, UNSPECIFIED HYPERLIPIDEMIA TYPE: ICD-10-CM

## 2023-06-09 RX ORDER — ATORVASTATIN CALCIUM 40 MG/1
40 TABLET, FILM COATED ORAL DAILY
Qty: 90 TABLET | Refills: 0 | Status: SHIPPED | OUTPATIENT
Start: 2023-06-09 | End: 2023-07-24 | Stop reason: SDUPTHER

## 2023-07-24 ENCOUNTER — OFFICE VISIT (OUTPATIENT)
Dept: FAMILY MEDICINE | Facility: CLINIC | Age: 48
End: 2023-07-24
Payer: MEDICAID

## 2023-07-24 VITALS
WEIGHT: 162.81 LBS | OXYGEN SATURATION: 98 % | HEIGHT: 70 IN | BODY MASS INDEX: 23.31 KG/M2 | RESPIRATION RATE: 18 BRPM | DIASTOLIC BLOOD PRESSURE: 71 MMHG | HEART RATE: 77 BPM | SYSTOLIC BLOOD PRESSURE: 114 MMHG | TEMPERATURE: 98 F

## 2023-07-24 DIAGNOSIS — Q67.6 PECTUS EXCAVATUM: ICD-10-CM

## 2023-07-24 DIAGNOSIS — J45.909 ASTHMA, UNSPECIFIED ASTHMA SEVERITY, UNSPECIFIED WHETHER COMPLICATED, UNSPECIFIED WHETHER PERSISTENT: ICD-10-CM

## 2023-07-24 DIAGNOSIS — F32.A DEPRESSIVE DISORDER: Primary | ICD-10-CM

## 2023-07-24 DIAGNOSIS — E78.5 HYPERLIPIDEMIA, UNSPECIFIED HYPERLIPIDEMIA TYPE: ICD-10-CM

## 2023-07-24 PROCEDURE — 99213 OFFICE O/P EST LOW 20 MIN: CPT | Mod: PBBFAC | Performed by: STUDENT IN AN ORGANIZED HEALTH CARE EDUCATION/TRAINING PROGRAM

## 2023-07-24 RX ORDER — FLUOXETINE HYDROCHLORIDE 20 MG/1
20 CAPSULE ORAL DAILY
Qty: 90 CAPSULE | Refills: 1 | Status: SHIPPED | OUTPATIENT
Start: 2023-07-24 | End: 2023-12-19 | Stop reason: SDUPTHER

## 2023-07-24 RX ORDER — ATORVASTATIN CALCIUM 40 MG/1
40 TABLET, FILM COATED ORAL DAILY
Qty: 90 TABLET | Refills: 1 | Status: SHIPPED | OUTPATIENT
Start: 2023-07-24 | End: 2023-12-19 | Stop reason: SDUPTHER

## 2023-07-24 NOTE — PROGRESS NOTES
[facility]    Subjective     Patient ID: Jed Multani is a 47 y.o. male.    Chief Complaint: Follow-up (6 month follow-up) and Medication Refill    HLD  - atorvastatin daily  - last lipid WNL; improved from previous    Depression  - no SI/HI  - reports mood has been good. Would like to maintain medication at current dose.    Chronic:  - Asthma: last inhaler use 5/2023.  - Pectus excavatum: reportedly stable since childhood    HM  - Cologuard negative 2/15/23- due 2/2026      Review of Systems   Constitutional:  Negative for activity change, appetite change, fatigue and fever.   Respiratory:  Negative for cough, chest tightness and shortness of breath.    Cardiovascular:  Negative for chest pain and palpitations.   Gastrointestinal:  Negative for abdominal pain, change in bowel habit and change in bowel habit.   Musculoskeletal:  Negative for arthralgias.   Neurological:  Negative for weakness.   Psychiatric/Behavioral:  Negative for dysphoric mood, sleep disturbance and suicidal ideas.         Objective   Vitals:    07/24/23 1245   BP: 114/71   Pulse: 77   Resp: 18   Temp: 97.5 °F (36.4 °C)       Physical Exam  Constitutional:       General: He is not in acute distress.     Appearance: He is not ill-appearing.   Cardiovascular:      Rate and Rhythm: Normal rate and regular rhythm.   Pulmonary:      Effort: Pulmonary effort is normal.      Breath sounds: Normal breath sounds.   Chest:      Chest wall: Deformity present. No tenderness, crepitus or edema.      Comments: Central sternal depression   Abdominal:      General: Bowel sounds are normal. There is no distension.      Palpations: Abdomen is soft.      Tenderness: There is no abdominal tenderness.   Musculoskeletal:      Right lower leg: No edema.      Left lower leg: No edema.   Skin:     General: Skin is warm and dry.   Neurological:      General: No focal deficit present.      Mental Status: He is oriented to person, place, and time.    Psychiatric:         Mood and Affect: Mood normal.         Behavior: Behavior normal.         Thought Content: Thought content normal.          Assessment and Plan     1. Depressive disorder  -     FLUoxetine 20 MG capsule; Take 1 capsule (20 mg total) by mouth once daily.  Dispense: 90 capsule; Refill: 1    2. Hyperlipidemia, unspecified hyperlipidemia type  Overview:  Pretreatment     Orders:  -     atorvastatin (LIPITOR) 40 MG tablet; Take 1 tablet (40 mg total) by mouth once daily.  Dispense: 90 tablet; Refill: 1    3. Asthma, unspecified asthma severity, unspecified whether complicated, unspecified whether persistent  -     albuterol sulfate 90 mcg/actuation aebs; Inhale 2 puffs into the lungs every 6 (six) hours as needed.  Dispense: 1 each; Refill: 2        Continue Lipitor. Refill provided  Continue Fluoxetine. Refill provided. Precautions and return instructions discussed.   Refill albuterol.   Pectus excavatum stable since childhood; symptom precautions discussed.     RTC in 6 months, repeat labs at that time    July Rosado M.D.  [facility] -3           Follow up in about 6 months (around 1/24/2024).

## 2023-12-19 ENCOUNTER — OFFICE VISIT (OUTPATIENT)
Dept: FAMILY MEDICINE | Facility: CLINIC | Age: 48
End: 2023-12-19
Payer: MEDICAID

## 2023-12-19 VITALS
DIASTOLIC BLOOD PRESSURE: 74 MMHG | HEART RATE: 79 BPM | OXYGEN SATURATION: 98 % | TEMPERATURE: 98 F | HEIGHT: 70 IN | BODY MASS INDEX: 23.34 KG/M2 | SYSTOLIC BLOOD PRESSURE: 116 MMHG | WEIGHT: 163 LBS

## 2023-12-19 DIAGNOSIS — M54.50 ACUTE RIGHT-SIDED LOW BACK PAIN WITHOUT SCIATICA: Primary | ICD-10-CM

## 2023-12-19 DIAGNOSIS — F32.A DEPRESSIVE DISORDER: ICD-10-CM

## 2023-12-19 DIAGNOSIS — E78.5 HYPERLIPIDEMIA, UNSPECIFIED HYPERLIPIDEMIA TYPE: ICD-10-CM

## 2023-12-19 LAB
ALBUMIN SERPL-MCNC: 4 G/DL (ref 3.5–5)
ALBUMIN/GLOB SERPL: 0.9 RATIO (ref 1.1–2)
ALP SERPL-CCNC: 76 UNIT/L (ref 40–150)
ALT SERPL-CCNC: 16 UNIT/L (ref 0–55)
APPEARANCE UR: CLEAR
AST SERPL-CCNC: 18 UNIT/L (ref 5–34)
BACTERIA #/AREA URNS AUTO: ABNORMAL /HPF
BASOPHILS # BLD AUTO: 0.06 X10(3)/MCL
BASOPHILS NFR BLD AUTO: 1.1 %
BILIRUB SERPL-MCNC: 0.9 MG/DL
BILIRUB UR QL STRIP.AUTO: NEGATIVE
BUN SERPL-MCNC: 9.8 MG/DL (ref 8.9–20.6)
CALCIUM SERPL-MCNC: 9.4 MG/DL (ref 8.4–10.2)
CHLORIDE SERPL-SCNC: 107 MMOL/L (ref 98–107)
CHOLEST SERPL-MCNC: 185 MG/DL
CHOLEST/HDLC SERPL: 4 {RATIO} (ref 0–5)
CO2 SERPL-SCNC: 27 MMOL/L (ref 22–29)
COLOR UR AUTO: YELLOW
CREAT SERPL-MCNC: 1.14 MG/DL (ref 0.73–1.18)
EOSINOPHIL # BLD AUTO: 0.39 X10(3)/MCL (ref 0–0.9)
EOSINOPHIL NFR BLD AUTO: 7.2 %
ERYTHROCYTE [DISTWIDTH] IN BLOOD BY AUTOMATED COUNT: 12.9 % (ref 11.5–17)
EST. AVERAGE GLUCOSE BLD GHB EST-MCNC: 111.2 MG/DL
GFR SERPLBLD CREATININE-BSD FMLA CKD-EPI: >60 MLS/MIN/1.73/M2
GLOBULIN SER-MCNC: 4.4 GM/DL (ref 2.4–3.5)
GLUCOSE SERPL-MCNC: 82 MG/DL (ref 74–100)
GLUCOSE UR QL STRIP.AUTO: NORMAL
HBA1C MFR BLD: 5.5 %
HCT VFR BLD AUTO: 43.6 % (ref 42–52)
HDLC SERPL-MCNC: 44 MG/DL (ref 35–60)
HGB BLD-MCNC: 14.9 G/DL (ref 14–18)
HYALINE CASTS #/AREA URNS LPF: ABNORMAL /LPF
IMM GRANULOCYTES # BLD AUTO: 0.01 X10(3)/MCL (ref 0–0.04)
IMM GRANULOCYTES NFR BLD AUTO: 0.2 %
KETONES UR QL STRIP.AUTO: NEGATIVE
LDLC SERPL CALC-MCNC: 129 MG/DL (ref 50–140)
LEUKOCYTE ESTERASE UR QL STRIP.AUTO: NEGATIVE
LYMPHOCYTES # BLD AUTO: 2.01 X10(3)/MCL (ref 0.6–4.6)
LYMPHOCYTES NFR BLD AUTO: 37.2 %
MCH RBC QN AUTO: 28 PG (ref 27–31)
MCHC RBC AUTO-ENTMCNC: 34.2 G/DL (ref 33–36)
MCV RBC AUTO: 82 FL (ref 80–94)
MONOCYTES # BLD AUTO: 0.42 X10(3)/MCL (ref 0.1–1.3)
MONOCYTES NFR BLD AUTO: 7.8 %
NEUTROPHILS # BLD AUTO: 2.52 X10(3)/MCL (ref 2.1–9.2)
NEUTROPHILS NFR BLD AUTO: 46.5 %
NITRITE UR QL STRIP.AUTO: NEGATIVE
NRBC BLD AUTO-RTO: 0 %
PH UR STRIP.AUTO: 5.5 [PH]
PLATELET # BLD AUTO: 267 X10(3)/MCL (ref 130–400)
PMV BLD AUTO: 11.6 FL (ref 7.4–10.4)
POTASSIUM SERPL-SCNC: 4.1 MMOL/L (ref 3.5–5.1)
PROT SERPL-MCNC: 8.4 GM/DL (ref 6.4–8.3)
PROT UR QL STRIP.AUTO: NEGATIVE
RBC # BLD AUTO: 5.32 X10(6)/MCL (ref 4.7–6.1)
RBC #/AREA URNS AUTO: ABNORMAL /HPF
RBC UR QL AUTO: NEGATIVE
SODIUM SERPL-SCNC: 141 MMOL/L (ref 136–145)
SP GR UR STRIP.AUTO: 1.02 (ref 1–1.03)
SQUAMOUS #/AREA URNS LPF: ABNORMAL /HPF
TRIGL SERPL-MCNC: 59 MG/DL (ref 34–140)
UROBILINOGEN UR STRIP-ACNC: NORMAL
VLDLC SERPL CALC-MCNC: 12 MG/DL
WBC # SPEC AUTO: 5.41 X10(3)/MCL (ref 4.5–11.5)
WBC #/AREA URNS AUTO: ABNORMAL /HPF

## 2023-12-19 PROCEDURE — 85025 COMPLETE CBC W/AUTO DIFF WBC: CPT | Performed by: STUDENT IN AN ORGANIZED HEALTH CARE EDUCATION/TRAINING PROGRAM

## 2023-12-19 PROCEDURE — 80053 COMPREHEN METABOLIC PANEL: CPT | Performed by: STUDENT IN AN ORGANIZED HEALTH CARE EDUCATION/TRAINING PROGRAM

## 2023-12-19 PROCEDURE — 81001 URINALYSIS AUTO W/SCOPE: CPT | Performed by: STUDENT IN AN ORGANIZED HEALTH CARE EDUCATION/TRAINING PROGRAM

## 2023-12-19 PROCEDURE — 80061 LIPID PANEL: CPT | Performed by: STUDENT IN AN ORGANIZED HEALTH CARE EDUCATION/TRAINING PROGRAM

## 2023-12-19 PROCEDURE — 99214 OFFICE O/P EST MOD 30 MIN: CPT | Mod: PBBFAC | Performed by: STUDENT IN AN ORGANIZED HEALTH CARE EDUCATION/TRAINING PROGRAM

## 2023-12-19 PROCEDURE — 83036 HEMOGLOBIN GLYCOSYLATED A1C: CPT | Performed by: STUDENT IN AN ORGANIZED HEALTH CARE EDUCATION/TRAINING PROGRAM

## 2023-12-19 PROCEDURE — 36415 COLL VENOUS BLD VENIPUNCTURE: CPT | Performed by: STUDENT IN AN ORGANIZED HEALTH CARE EDUCATION/TRAINING PROGRAM

## 2023-12-19 RX ORDER — FLUOXETINE HYDROCHLORIDE 20 MG/1
20 CAPSULE ORAL DAILY
Qty: 90 CAPSULE | Refills: 1 | Status: SHIPPED | OUTPATIENT
Start: 2023-12-19

## 2023-12-19 RX ORDER — DICLOFENAC SODIUM 75 MG/1
75 TABLET, DELAYED RELEASE ORAL 2 TIMES DAILY
Qty: 28 TABLET | Refills: 0 | Status: SHIPPED | OUTPATIENT
Start: 2023-12-19 | End: 2024-01-02

## 2023-12-19 RX ORDER — ATORVASTATIN CALCIUM 40 MG/1
40 TABLET, FILM COATED ORAL DAILY
Qty: 90 TABLET | Refills: 1 | Status: SHIPPED | OUTPATIENT
Start: 2023-12-19

## 2023-12-19 NOTE — PROGRESS NOTES
[facility]    Subjective     Patient ID: Jed Multani is a 48 y.o. male.    Chief Complaint: Flank Pain    Lower back pain  - x 2 weeks  - worse with prolonged standing and bending  - Similar pain last year that resolved after d/c drinking soda. Pt believes pain began with drinking soda again.  - denies new activity or recent injury.  - indicates pain lower back right side. Denies radiation.    HLD  - atorvastatin daily  - last lipid WNL; improved from previous    Chronic:  - Asthma: not needed inhaler since last visit 7/2023  - Pectus excavatum: reportedly stable since childhood  - Depression- stable Fluoxetine 20 mg      - Cologuard negative 2/15/23- due 2/2026  - Pt reports getting flu vaccine at [facility]. Not listed in chart.      Review of Systems   Constitutional:  Negative for activity change.   Respiratory:  Negative for chest tightness and shortness of breath.    Gastrointestinal:  Negative for abdominal pain, change in bowel habit and nausea.   Genitourinary:  Negative for dysuria, frequency and hematuria.   Musculoskeletal:  Positive for back pain.          Objective   Vitals:    12/19/23 1306   BP: 116/74   Pulse: 79   Temp: 98.2 °F (36.8 °C)       Physical Exam  Constitutional:       General: He is not in acute distress.     Appearance: He is not ill-appearing.   Cardiovascular:      Rate and Rhythm: Normal rate and regular rhythm.   Pulmonary:      Effort: Pulmonary effort is normal.      Breath sounds: Normal breath sounds.   Abdominal:      General: Bowel sounds are normal. There is no distension.      Palpations: Abdomen is soft.      Tenderness: There is no abdominal tenderness. There is no right CVA tenderness or left CVA tenderness.   Musculoskeletal:      Comments: Pain elicited with left lateral flexion and forward flexion. Paraspinal muscle tenderness.      Skin:     General: Skin is warm and dry.          Assessment and Plan     1. Flank pain  -     Urinalysis, Reflex  to Urine Culture    2. Hyperlipidemia, unspecified hyperlipidemia type  Overview:  Pretreatment     Orders:  -     Lipid Panel; Future; Expected date: 12/19/2023  -     CBC Auto Differential; Future; Expected date: 12/19/2023  -     Comprehensive Metabolic Panel; Future; Expected date: 12/19/2023  -     Hemoglobin A1C    3. Depressive disorder    Other orders  -     influenza (QUADRIVALENT PF) vaccine 0.5 mL        Labs as listed above  Refill Fluoxetine and statin.   Diclofenac BID with food  UA ordered     RTC 6 months, instructed to call for appt should pain worsen/not improve.     July Rosado M.D.  [facility] HO-3

## 2023-12-20 NOTE — PROGRESS NOTES
I reviewed History, PE, A/P and medical record.  Services provided in outpatient department of a teaching hospital/facility, I was immediately available.  I agree with resident, care reasonable and necessary.   I evaluated the patient with resident at time of visit, participated in key parts of H/P and management was discussed.    Mostly consistent with musculoskeletal LBP, tender L4-5 and lateral to spine, (+) spasm just above right IS, FF knees- stiff, full ext w/o pain, left lateral flexion = 10 degrees with pulling pain in right , moves well  No neuro deficits  Denies activity, trauma   XR if cont    Mikala Blount MD  [facility] - [facility]

## 2024-06-20 ENCOUNTER — OFFICE VISIT (OUTPATIENT)
Dept: FAMILY MEDICINE | Facility: CLINIC | Age: 49
End: 2024-06-20
Payer: MEDICAID

## 2024-06-20 VITALS
OXYGEN SATURATION: 98 % | HEART RATE: 86 BPM | WEIGHT: 168.38 LBS | HEIGHT: 70 IN | DIASTOLIC BLOOD PRESSURE: 84 MMHG | SYSTOLIC BLOOD PRESSURE: 120 MMHG | BODY MASS INDEX: 24.11 KG/M2 | TEMPERATURE: 99 F

## 2024-06-20 DIAGNOSIS — F32.A DEPRESSIVE DISORDER: ICD-10-CM

## 2024-06-20 DIAGNOSIS — E78.5 HYPERLIPIDEMIA, UNSPECIFIED HYPERLIPIDEMIA TYPE: Primary | ICD-10-CM

## 2024-06-20 PROCEDURE — 99213 OFFICE O/P EST LOW 20 MIN: CPT | Mod: PBBFAC | Performed by: STUDENT IN AN ORGANIZED HEALTH CARE EDUCATION/TRAINING PROGRAM

## 2024-06-20 RX ORDER — FLUOXETINE HYDROCHLORIDE 20 MG/1
20 CAPSULE ORAL DAILY
Qty: 90 CAPSULE | Refills: 1 | Status: SHIPPED | OUTPATIENT
Start: 2024-06-20

## 2024-06-20 RX ORDER — ATORVASTATIN CALCIUM 40 MG/1
40 TABLET, FILM COATED ORAL DAILY
Qty: 90 TABLET | Refills: 1 | Status: SHIPPED | OUTPATIENT
Start: 2024-06-20

## 2024-06-20 NOTE — PROGRESS NOTES
"[facility]  Office Visit Note    Subjective   Jed Flugence  [id number]  06/20/2024    Chief Complaint: MED REFILLS    Jed Multani is a 48 y.o. male  presenting to [facility] for routine f/u.    HLD  - atorvastatin daily  - last lipid WNL    Depression  - PHG9 - 0  - feels mood is good overall and does not want medication adjustment    Chronic:  - Asthma: not needed inhaler since last visit 7/2023  - Pectus excavatum: reportedly stable since childhood  - Depression- stable Fluoxetine 20 mg     HM  - Cologuard negative 2/15/23- due 2/2026      Review of Systems   Constitutional:  Negative for fever.   Respiratory:  Negative for shortness of breath.    Cardiovascular:  Negative for chest pain and palpitations.   Gastrointestinal:  Negative for abdominal pain.   Neurological:  Negative for weakness, numbness and headaches.   Psychiatric/Behavioral:  Negative for behavioral problems, decreased concentration and dysphoric mood.        Objective    Vitals:    06/20/24 1304   BP: 120/84   BP Location: Left arm   Patient Position: Sitting   BP Method: Large (Automatic)   Pulse: 86   Temp: 98.8 °F (37.1 °C)   TempSrc: Oral   SpO2: 98%   Weight: 76.4 kg (168 lb 6.4 oz)   Height: 5' 10" (1.778 m)        Physical Exam  Constitutional:       General: He is not in acute distress.     Appearance: He is not ill-appearing.   Cardiovascular:      Rate and Rhythm: Normal rate and regular rhythm.   Pulmonary:      Effort: Pulmonary effort is normal.      Breath sounds: Normal breath sounds.   Abdominal:      General: Bowel sounds are normal.      Palpations: Abdomen is soft.   Musculoskeletal:      Right lower leg: No edema.      Left lower leg: No edema.      Comments: Pectus excavatum   Skin:     General: Skin is warm and dry.         Current Medications:   Current Outpatient Medications   Medication Sig Dispense Refill    albuterol sulfate 90 mcg/actuation aebs Inhale 2 puffs into the lungs every 6 (six) hours " as needed. 1 each 2    atorvastatin (LIPITOR) 40 MG tablet Take 1 tablet (40 mg total) by mouth once daily. 90 tablet 1    FLUoxetine 20 MG capsule Take 1 capsule (20 mg total) by mouth once daily. 90 capsule 1     No current facility-administered medications for this visit.       Assessment & Plan:  1. Hyperlipidemia, unspecified hyperlipidemia type    2. Depressive disorder        Orders Placed This Encounter    FLUoxetine 20 MG capsule    atorvastatin (LIPITOR) 40 MG tablet       Continue fluoxetine 20 mg  Continue Lipitor 40 mg  Return precautions given      RTC in 4 months      July Rosado M.D.  [facility] HO[facility]   Diagnostic Uncertainty

## 2024-10-21 NOTE — PROGRESS NOTES
[facility]    CHIEF COMPLAINT:  Chief Complaint   Patient presents with    Follow-up     Pt c/o right breast pain        HISTORY OF  PRESENT ILLNESS:  Jed Multani is a 48 y.o. male who presents to clinic today with a complaint of right nipple pain. He states he has deann having pain in his right nipple for four weeks now. He describes the pain as burning and only is bothersome when he touches it or when his clothes are rubbing against it. He has not tried any pain medications or any warm or cold compresses. He denies any redness to the area, discharge, or any lumps or bumps. He reports he feel like it is slightly swollen. He states he has had a similar pain like this in the past about a year ago. He mentioned someone recommending him to take green tea capsules. He states that taking the capsules for about a week made the pain go away. He no longer takes the green tea capsules.      Chronic issues:  1) Asthma  No flares since 07/2023.    2) Depression  Mood stable on fluoxetine 20 mg q.d.    3) Hyperlipidemia  On Lipitor 40 mg q.d.  , cholesterol 185, TG 59 in 12/2023      Health Maintenance:  Colon Cancer screening: Cologuard negative 2/15/23- due 2/2026   Immunizations:       History reviewed. No pertinent past medical history. History reviewed. No pertinent surgical history.   Social History     Socioeconomic History    Marital status: Single   Tobacco Use    Smoking status: Never    Smokeless tobacco: Never   Substance and Sexual Activity    Alcohol use: Never    Drug use: Never         Review of Most Recent Wound Care-Related Labs:  Lab Results   Component Value Date/Time    WBC 5.41 12/19/2023 01:45 PM    RBC 5.32 12/19/2023 01:45 PM    HGB 14.9 12/19/2023 01:45 PM    HCT 43.6 12/19/2023 01:45 PM    MCV 82.0 12/19/2023 01:45 PM    MCH 28.0 12/19/2023 01:45 PM    CREATININE 1.14 12/19/2023 01:45 PM    HGBA1C 5.5 12/19/2023 01:45 PM        PHYSICAL EXAMINATION:  Blood pressure 130/83, pulse  "63, temperature 98.2 °F (36.8 °C), temperature source Oral, resp. rate 18, height 5' 10" (1.778 m), weight 76.4 kg (168 lb 6.9 oz), SpO2 100%.    General:  VSS. No acute distress.   Respiratory: clear to ascultation in all lung fields  Cardiovascular:  RRR, no additional heart sounds heard.  Skin:  Right breast tissue, areola and nipple symmetric in size with left. Tissue is soft without palpable mass, fluctuance or induration.  No expressed drainage, erythema or swelling bilaterally.  Right nipple tender to palpation.  Left breast tissue and nipple without tenderness to palpation.      ASSESSMENT/PLAN:    Right nipple sensitivity  - Instructed to take NSAIDs OTC for symptomatic relief. Can also use heating pads.  - Return to clinic if symptoms worsen or patient starts to have drainage      Hyperlipidemia  - Continue Lipitor 20 mg q.d.  - Will need r/p labs at f/u visit      Depressive disorder  - Continue fluoxetine 20 mg q.d.      RTC in 3 months, or sooner if needed.       Jana Bello M.D  [facility], [facility]  "

## 2024-10-22 ENCOUNTER — OFFICE VISIT (OUTPATIENT)
Dept: FAMILY MEDICINE | Facility: CLINIC | Age: 49
End: 2024-10-22
Payer: MEDICAID

## 2024-10-22 VITALS
TEMPERATURE: 98 F | SYSTOLIC BLOOD PRESSURE: 130 MMHG | HEIGHT: 70 IN | WEIGHT: 168.44 LBS | BODY MASS INDEX: 24.11 KG/M2 | DIASTOLIC BLOOD PRESSURE: 83 MMHG | HEART RATE: 63 BPM | OXYGEN SATURATION: 100 % | RESPIRATION RATE: 18 BRPM

## 2024-10-22 DIAGNOSIS — F32.A DEPRESSIVE DISORDER: ICD-10-CM

## 2024-10-22 DIAGNOSIS — E78.5 HYPERLIPIDEMIA, UNSPECIFIED HYPERLIPIDEMIA TYPE: ICD-10-CM

## 2024-10-22 DIAGNOSIS — N64.4 NIPPLE TENDERNESS: Primary | ICD-10-CM

## 2024-10-22 PROCEDURE — 99213 OFFICE O/P EST LOW 20 MIN: CPT | Mod: PBBFAC

## 2024-10-23 NOTE — PROGRESS NOTES
Discussed with resident at time of encounter 10-22-24.  Pt. seen.  Right nipple sensitivity / discomfort.  No reported trauma; neg. discharge.    Resident's note reviewed 10-22-24.  Agree with resident's physical findings.  Agree with assessment; plan of care appropriate.  Re-evaluate if unchanged.  Professional services provided in an outpatient primary care center affiliated with a [facility] institution.

## 2025-01-23 NOTE — PROGRESS NOTES
"[facility] Clinic Note    CHIEF COMPLAINT:  Chief Complaint   Patient presents with    Follow-up    Medication Refill     Rx on Albuterol Sulfate , Lipitor, and Fluoxetine.        HISTORY OF  PRESENT ILLNESS:  Jed Multani is a 49 y.o. male who presents to clinic today for routine follow up and medication refills.  Patient reports doing well. No concerns today. C/o right nipple pain at previous visit but reports that this has since resolved.  Denies any recent illnesses, fever, chills, chest pain, shortness of breath, dizziness, lightheadedness, nausea or vomiting, abdominal pain.      Health Maintenance:   Colon Cancer screening: Colon Cancer screening: Cologuard negative 2/15/23- due 2/2026.  Informed refusal of colonoscopy.  Immunizations:  Declined influenza vaccine today.      Past Medical History:   - Asthma : No flares since 07/2023.  Albuterol p.r.n.  - Depression - Mood stable on fluoxetine 20 mg q.d. denies SI/HI/hallucinations.  - Hyperlipidemia - Lipitor 40 mg q.d.       Current Outpatient Medications   Medication Instructions    albuterol sulfate 90 mcg/actuation aebs 2 puffs, Inhalation, Every 6 hours PRN    atorvastatin (LIPITOR) 40 mg, Oral, Daily    FLUoxetine 20 mg, Oral, Daily     The 10-year ASCVD risk score (Cam DK, et al., 2019) is: 4.1%    Values used to calculate the score:      Age: 49 years      Sex: Male      Is Non- : Yes      Diabetic: No      Tobacco smoker: No      Systolic Blood Pressure: 116 mmHg      Is BP treated: No      HDL Cholesterol: 44 mg/dL      Total Cholesterol: 185 mg/dL      PHYSICAL EXAMINATION:  Vitals:    01/24/25 1256   BP: 116/72   BP Location: Left arm   Patient Position: Sitting   Pulse: 89   Resp: 20   Temp: 97.8 °F (36.6 °C)   TempSrc: Oral   SpO2: 98%   Weight: 76.8 kg (169 lb 6.4 oz)   Height: 5' 10" (1.778 m)       General:  VSS. No acute distress.   Respiratory: clear to ascultation in all lung fields  Cardiovascular:  RRR, no " additional heart sounds heard.  Musculoskeletal:  Full range of motion of all extremities  Neurologic:  A&O X 3.         ASSESSMENT/PLAN:    Hyperlipidemia  Asthma (no recent flares)  Depression  Refilled Lipitor 40 mg q.d., Zoloft and albuterol inhaler upon patient's request.  Routine labs - CBC, CMP, fasting lipid panel, vitamin D, TSH, A1c ordered for lab collect. Patient aware and able to go to the lab to get blood drawn next week.      RTC in 3-4 months, or sooner if needed      Jana Bello M.D  [facility] Resident, HO[facility]II

## 2025-01-24 ENCOUNTER — OFFICE VISIT (OUTPATIENT)
Dept: FAMILY MEDICINE | Facility: CLINIC | Age: 50
End: 2025-01-24
Payer: MEDICAID

## 2025-01-24 VITALS
OXYGEN SATURATION: 98 % | HEART RATE: 89 BPM | DIASTOLIC BLOOD PRESSURE: 72 MMHG | SYSTOLIC BLOOD PRESSURE: 116 MMHG | WEIGHT: 169.38 LBS | TEMPERATURE: 98 F | BODY MASS INDEX: 24.25 KG/M2 | HEIGHT: 70 IN | RESPIRATION RATE: 20 BRPM

## 2025-01-24 DIAGNOSIS — E78.5 HYPERLIPIDEMIA, UNSPECIFIED HYPERLIPIDEMIA TYPE: Primary | ICD-10-CM

## 2025-01-24 DIAGNOSIS — J45.909 ASTHMA, UNSPECIFIED ASTHMA SEVERITY, UNSPECIFIED WHETHER COMPLICATED, UNSPECIFIED WHETHER PERSISTENT: ICD-10-CM

## 2025-01-24 DIAGNOSIS — F32.A DEPRESSIVE DISORDER: ICD-10-CM

## 2025-01-24 PROCEDURE — 99214 OFFICE O/P EST MOD 30 MIN: CPT | Mod: PBBFAC

## 2025-01-24 RX ORDER — ATORVASTATIN CALCIUM 40 MG/1
40 TABLET, FILM COATED ORAL DAILY
Qty: 90 TABLET | Refills: 1 | Status: SHIPPED | OUTPATIENT
Start: 2025-01-24

## 2025-01-24 RX ORDER — FLUOXETINE HYDROCHLORIDE 20 MG/1
20 CAPSULE ORAL DAILY
Qty: 90 CAPSULE | Refills: 1 | Status: SHIPPED | OUTPATIENT
Start: 2025-01-24

## 2025-01-24 NOTE — PROGRESS NOTES
I have discussed the case with the resident and reviewed the resident's history and physical, assessment, plan, and progress note. I agree with the findings.       Van Tobar MD  [facility]

## 2025-05-22 NOTE — PROGRESS NOTES
"[place] Clinic Note    CHIEF COMPLAINT:  Chief Complaint   Patient presents with    Follow-up    Hyperlipidemia       HISTORY OF  PRESENT ILLNESS:  Jed Multani is a 49 y.o. male who presents to clinic today for routine follow up of chronic conditions and medication refills. No acute concerns reported today.    Hyperlipidemia  - Adherent to Lipitor 40mg qd. Denies any side effects on medication.  - Last  (12/2023).  - R/p lipid panel as well as routine labs ordered at last visit but yet to be done.     Asthma  - No flares >2 years.  - Uses albuterol PRN.    Depression  - Mood stable on Fluoxetine 20mg QD.   - Denies any depressed mood, insomnia, SI/HI/hallucinations.      Health Maintenance:  Colon Cancer screening: Cologuard negative 2/15/23- due 2/2026.  Informed refusal of colonoscopy today.         Current Outpatient Medications   Medication Instructions    albuterol sulfate 90 mcg/actuation aebs 2 puffs, Inhalation, Every 6 hours PRN    atorvastatin (LIPITOR) 40 mg, Oral, Daily    FLUoxetine 20 mg, Oral, Daily         PHYSICAL EXAMINATION:  Vitals:    05/23/25 1053   BP: 111/69   BP Location: Right arm   Patient Position: Sitting   Pulse: 64   Temp: 97.9 °F (36.6 °C)   TempSrc: Oral   SpO2: 99%   Weight: 74.8 kg (164 lb 12.8 oz)   Height: 5' 10" (1.778 m)       General:  VSS. No acute distress.   Respiratory: clear to ascultation in all lung fields  Cardiovascular:  RRR, no additional heart sounds heard.  Musculoskeletal:  Full range of motion of all extremities  Neurologic:  A&O X 3.           ASSESSMENT/PLAN:    Hyperlipidemia  Asthma (no recent flares)  Depression  CBC, CMP, A1C, Lipid panel, Vit D and TSH ordered. Mr. Escobedo (lab) not available today so patient will go to main lab from here to get blood drawn.   Continue Lipitor 40 mg q.d. Refills sent  Continue fluoxetine 20 mg q.d. Refills sent  Refills sent for Albuterol PRN.      RTC in 4 months, or sooner if needed.        Jana DIAZ" PASCUAL Bello  \[facility] [place] [facility]\"" Family Medicine Resident, HO-II

## 2025-05-23 ENCOUNTER — LAB VISIT (OUTPATIENT)
Dept: LAB | Facility: HOSPITAL | Age: 50
End: 2025-05-23
Payer: MEDICAID

## 2025-05-23 ENCOUNTER — OFFICE VISIT (OUTPATIENT)
Dept: FAMILY MEDICINE | Facility: CLINIC | Age: 50
End: 2025-05-23
Payer: MEDICAID

## 2025-05-23 VITALS
TEMPERATURE: 98 F | OXYGEN SATURATION: 99 % | BODY MASS INDEX: 23.6 KG/M2 | WEIGHT: 164.81 LBS | HEIGHT: 70 IN | SYSTOLIC BLOOD PRESSURE: 111 MMHG | DIASTOLIC BLOOD PRESSURE: 69 MMHG | HEART RATE: 64 BPM

## 2025-05-23 DIAGNOSIS — F32.A DEPRESSIVE DISORDER: ICD-10-CM

## 2025-05-23 DIAGNOSIS — E78.5 HYPERLIPIDEMIA, UNSPECIFIED HYPERLIPIDEMIA TYPE: ICD-10-CM

## 2025-05-23 DIAGNOSIS — E78.5 HYPERLIPIDEMIA, UNSPECIFIED HYPERLIPIDEMIA TYPE: Primary | ICD-10-CM

## 2025-05-23 DIAGNOSIS — J45.909 ASTHMA, UNSPECIFIED ASTHMA SEVERITY, UNSPECIFIED WHETHER COMPLICATED, UNSPECIFIED WHETHER PERSISTENT: ICD-10-CM

## 2025-05-23 LAB
25(OH)D3+25(OH)D2 SERPL-MCNC: 14 NG/ML (ref 30–80)
ALBUMIN SERPL-MCNC: 3.9 G/DL (ref 3.5–5)
ALBUMIN/GLOB SERPL: 0.9 RATIO (ref 1.1–2)
ALP SERPL-CCNC: 79 UNIT/L (ref 40–150)
ALT SERPL-CCNC: 14 UNIT/L (ref 0–55)
ANION GAP SERPL CALC-SCNC: 7 MEQ/L
AST SERPL-CCNC: 18 UNIT/L (ref 11–45)
BASOPHILS # BLD AUTO: 0.03 X10(3)/MCL
BASOPHILS NFR BLD AUTO: 0.5 %
BILIRUB SERPL-MCNC: 0.8 MG/DL
BUN SERPL-MCNC: 12.6 MG/DL (ref 8.9–20.6)
CALCIUM SERPL-MCNC: 9.3 MG/DL (ref 8.4–10.2)
CHLORIDE SERPL-SCNC: 108 MMOL/L (ref 98–107)
CHOLEST SERPL-MCNC: 193 MG/DL
CHOLEST/HDLC SERPL: 4 {RATIO} (ref 0–5)
CO2 SERPL-SCNC: 26 MMOL/L (ref 22–29)
CREAT SERPL-MCNC: 1.12 MG/DL (ref 0.72–1.25)
CREAT/UREA NIT SERPL: 11
EOSINOPHIL # BLD AUTO: 0.5 X10(3)/MCL (ref 0–0.9)
EOSINOPHIL NFR BLD AUTO: 9.1 %
ERYTHROCYTE [DISTWIDTH] IN BLOOD BY AUTOMATED COUNT: 13 % (ref 11.5–17)
EST. AVERAGE GLUCOSE BLD GHB EST-MCNC: 111.2 MG/DL
GFR SERPLBLD CREATININE-BSD FMLA CKD-EPI: >60 ML/MIN/1.73/M2
GLOBULIN SER-MCNC: 4.3 GM/DL (ref 2.4–3.5)
GLUCOSE SERPL-MCNC: 93 MG/DL (ref 74–100)
HBA1C MFR BLD: 5.5 %
HCT VFR BLD AUTO: 42.9 % (ref 42–52)
HDLC SERPL-MCNC: 45 MG/DL (ref 35–60)
HGB BLD-MCNC: 14.2 G/DL (ref 14–18)
IMM GRANULOCYTES # BLD AUTO: 0.01 X10(3)/MCL (ref 0–0.04)
IMM GRANULOCYTES NFR BLD AUTO: 0.2 %
LDLC SERPL CALC-MCNC: 139 MG/DL (ref 50–140)
LYMPHOCYTES # BLD AUTO: 2.06 X10(3)/MCL (ref 0.6–4.6)
LYMPHOCYTES NFR BLD AUTO: 37.5 %
MCH RBC QN AUTO: 27.3 PG (ref 27–31)
MCHC RBC AUTO-ENTMCNC: 33.1 G/DL (ref 33–36)
MCV RBC AUTO: 82.5 FL (ref 80–94)
MONOCYTES # BLD AUTO: 0.43 X10(3)/MCL (ref 0.1–1.3)
MONOCYTES NFR BLD AUTO: 7.8 %
NEUTROPHILS # BLD AUTO: 2.47 X10(3)/MCL (ref 2.1–9.2)
NEUTROPHILS NFR BLD AUTO: 44.9 %
NRBC BLD AUTO-RTO: 0 %
PLATELET # BLD AUTO: 197 X10(3)/MCL (ref 130–400)
PMV BLD AUTO: 11.5 FL (ref 7.4–10.4)
POTASSIUM SERPL-SCNC: 4 MMOL/L (ref 3.5–5.1)
PROT SERPL-MCNC: 8.2 GM/DL (ref 6.4–8.3)
RBC # BLD AUTO: 5.2 X10(6)/MCL (ref 4.7–6.1)
SODIUM SERPL-SCNC: 141 MMOL/L (ref 136–145)
TRIGL SERPL-MCNC: 47 MG/DL (ref 34–140)
TSH SERPL-ACNC: 1.87 UIU/ML (ref 0.35–4.94)
VLDLC SERPL CALC-MCNC: 9 MG/DL
WBC # BLD AUTO: 5.5 X10(3)/MCL (ref 4.5–11.5)

## 2025-05-23 PROCEDURE — 82306 VITAMIN D 25 HYDROXY: CPT

## 2025-05-23 PROCEDURE — 83036 HEMOGLOBIN GLYCOSYLATED A1C: CPT

## 2025-05-23 PROCEDURE — 85025 COMPLETE CBC W/AUTO DIFF WBC: CPT

## 2025-05-23 PROCEDURE — 80061 LIPID PANEL: CPT

## 2025-05-23 PROCEDURE — 84443 ASSAY THYROID STIM HORMONE: CPT

## 2025-05-23 PROCEDURE — 99213 OFFICE O/P EST LOW 20 MIN: CPT | Mod: PBBFAC

## 2025-05-23 PROCEDURE — 36415 COLL VENOUS BLD VENIPUNCTURE: CPT

## 2025-05-23 PROCEDURE — 80053 COMPREHEN METABOLIC PANEL: CPT

## 2025-05-23 RX ORDER — ATORVASTATIN CALCIUM 40 MG/1
40 TABLET, FILM COATED ORAL DAILY
Qty: 90 TABLET | Refills: 1 | Status: SHIPPED | OUTPATIENT
Start: 2025-05-23

## 2025-05-23 RX ORDER — FLUOXETINE 20 MG/1
20 CAPSULE ORAL DAILY
Qty: 90 CAPSULE | Refills: 1 | Status: SHIPPED | OUTPATIENT
Start: 2025-05-23

## 2025-06-20 ENCOUNTER — TELEPHONE (OUTPATIENT)
Dept: FAMILY MEDICINE | Facility: CLINIC | Age: 50
End: 2025-06-20
Payer: MEDICAID

## 2025-06-25 ENCOUNTER — TELEPHONE (OUTPATIENT)
Dept: FAMILY MEDICINE | Facility: CLINIC | Age: 50
End: 2025-06-25
Payer: MEDICAID

## 2025-06-25 RX ORDER — ERGOCALCIFEROL 1.25 MG/1
50000 CAPSULE ORAL
Qty: 28 CAPSULE | Refills: 0 | Status: SHIPPED | OUTPATIENT
Start: 2025-06-25

## 2025-06-25 NOTE — TELEPHONE ENCOUNTER
Contacted patient regarding low vitamin D levels. Will send rx for Vit D supplements to replete.      Jana Bello MD  [facility] Family [facility] Resident, -II